# Patient Record
Sex: FEMALE | Race: WHITE | NOT HISPANIC OR LATINO | URBAN - METROPOLITAN AREA
[De-identification: names, ages, dates, MRNs, and addresses within clinical notes are randomized per-mention and may not be internally consistent; named-entity substitution may affect disease eponyms.]

---
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
## 2021-12-21 ENCOUNTER — APPOINTMENT (OUTPATIENT)
Dept: LAB | Facility: MEDICAL CENTER | Age: 21
End: 2021-12-21
Payer: COMMERCIAL

## 2021-12-21 ENCOUNTER — OFFICE VISIT (OUTPATIENT)
Dept: FAMILY MEDICINE CLINIC | Facility: MEDICAL CENTER | Age: 21
End: 2021-12-21
Payer: COMMERCIAL

## 2021-12-21 VITALS
TEMPERATURE: 98.2 F | SYSTOLIC BLOOD PRESSURE: 122 MMHG | HEIGHT: 63 IN | DIASTOLIC BLOOD PRESSURE: 80 MMHG | HEART RATE: 62 BPM | WEIGHT: 195.4 LBS | BODY MASS INDEX: 34.62 KG/M2 | OXYGEN SATURATION: 99 %

## 2021-12-21 DIAGNOSIS — Z11.4 SCREENING FOR HIV WITHOUT PRESENCE OF RISK FACTORS: ICD-10-CM

## 2021-12-21 DIAGNOSIS — Z76.89 ENCOUNTER TO ESTABLISH CARE: ICD-10-CM

## 2021-12-21 DIAGNOSIS — Z23 ENCOUNTER FOR IMMUNIZATION: ICD-10-CM

## 2021-12-21 DIAGNOSIS — Z13.220 SCREENING, LIPID: ICD-10-CM

## 2021-12-21 DIAGNOSIS — Z11.59 NEED FOR HEPATITIS C SCREENING TEST: ICD-10-CM

## 2021-12-21 DIAGNOSIS — Z00.8 EXAM FOR POPULATION SURVEY: ICD-10-CM

## 2021-12-21 DIAGNOSIS — Z13.89 SCREENING FOR BLOOD OR PROTEIN IN URINE: ICD-10-CM

## 2021-12-21 DIAGNOSIS — B07.0 PLANTAR WART OF RIGHT FOOT: Primary | ICD-10-CM

## 2021-12-21 PROCEDURE — 90686 IIV4 VACC NO PRSV 0.5 ML IM: CPT

## 2021-12-21 PROCEDURE — 90715 TDAP VACCINE 7 YRS/> IM: CPT

## 2021-12-21 PROCEDURE — 87389 HIV-1 AG W/HIV-1&-2 AB AG IA: CPT

## 2021-12-21 PROCEDURE — 99214 OFFICE O/P EST MOD 30 MIN: CPT | Performed by: NURSE PRACTITIONER

## 2021-12-21 PROCEDURE — 90472 IMMUNIZATION ADMIN EACH ADD: CPT

## 2021-12-21 PROCEDURE — 86803 HEPATITIS C AB TEST: CPT

## 2021-12-21 PROCEDURE — 90471 IMMUNIZATION ADMIN: CPT

## 2021-12-21 PROCEDURE — 36415 COLL VENOUS BLD VENIPUNCTURE: CPT

## 2021-12-21 RX ORDER — DROSPIRENONE AND ETHINYL ESTRADIOL 0.02-3(28)
1 KIT ORAL DAILY
COMMUNITY
Start: 2021-10-15

## 2021-12-21 RX ORDER — MEDICATED PLANTAR WART REMOVER 40 MG/241
PATCH TOPICAL
Qty: 24 EACH | Refills: 1 | Status: SHIPPED | OUTPATIENT
Start: 2021-12-21

## 2021-12-21 RX ORDER — FEXOFENADINE HCL AND PSEUDOEPHEDRINE HCI 180; 240 MG/1; MG/1
1 TABLET, EXTENDED RELEASE ORAL
COMMUNITY
End: 2022-06-13

## 2021-12-22 LAB
HCV AB SER QL: NORMAL
HIV 1+2 AB+HIV1 P24 AG SERPL QL IA: NORMAL

## 2021-12-27 PROBLEM — Z00.00 ANNUAL PHYSICAL EXAM: Status: ACTIVE | Noted: 2021-12-21

## 2021-12-27 PROBLEM — E66.811 CLASS 1 OBESITY DUE TO EXCESS CALORIES WITHOUT SERIOUS COMORBIDITY WITH BODY MASS INDEX (BMI) OF 34.0 TO 34.9 IN ADULT: Status: ACTIVE | Noted: 2021-12-27

## 2021-12-27 PROBLEM — J30.2 SEASONAL ALLERGIES: Status: ACTIVE | Noted: 2021-12-27

## 2021-12-27 PROBLEM — E66.09 CLASS 1 OBESITY DUE TO EXCESS CALORIES WITHOUT SERIOUS COMORBIDITY WITH BODY MASS INDEX (BMI) OF 34.0 TO 34.9 IN ADULT: Status: ACTIVE | Noted: 2021-12-27

## 2021-12-28 ENCOUNTER — APPOINTMENT (OUTPATIENT)
Dept: LAB | Facility: MEDICAL CENTER | Age: 21
End: 2021-12-28
Payer: COMMERCIAL

## 2021-12-28 ENCOUNTER — OFFICE VISIT (OUTPATIENT)
Dept: FAMILY MEDICINE CLINIC | Facility: MEDICAL CENTER | Age: 21
End: 2021-12-28
Payer: COMMERCIAL

## 2021-12-28 VITALS
SYSTOLIC BLOOD PRESSURE: 122 MMHG | WEIGHT: 199.4 LBS | RESPIRATION RATE: 16 BRPM | TEMPERATURE: 99.8 F | HEIGHT: 63 IN | BODY MASS INDEX: 35.33 KG/M2 | HEART RATE: 76 BPM | DIASTOLIC BLOOD PRESSURE: 76 MMHG

## 2021-12-28 DIAGNOSIS — B07.0 PLANTAR WART OF RIGHT FOOT: ICD-10-CM

## 2021-12-28 DIAGNOSIS — J30.2 SEASONAL ALLERGIES: ICD-10-CM

## 2021-12-28 DIAGNOSIS — Z00.8 EXAM FOR POPULATION SURVEY: ICD-10-CM

## 2021-12-28 DIAGNOSIS — Z00.00 ANNUAL PHYSICAL EXAM: Primary | ICD-10-CM

## 2021-12-28 DIAGNOSIS — E66.09 CLASS 1 OBESITY DUE TO EXCESS CALORIES WITHOUT SERIOUS COMORBIDITY WITH BODY MASS INDEX (BMI) OF 34.0 TO 34.9 IN ADULT: ICD-10-CM

## 2021-12-28 LAB
ALBUMIN SERPL BCP-MCNC: 3.7 G/DL (ref 3.5–5)
ALP SERPL-CCNC: 67 U/L (ref 46–116)
ALT SERPL W P-5'-P-CCNC: 23 U/L (ref 12–78)
ANION GAP SERPL CALCULATED.3IONS-SCNC: 6 MMOL/L (ref 4–13)
AST SERPL W P-5'-P-CCNC: 12 U/L (ref 5–45)
BASOPHILS # BLD AUTO: 0.04 THOUSANDS/ΜL (ref 0–0.1)
BASOPHILS NFR BLD AUTO: 0 % (ref 0–1)
BILIRUB SERPL-MCNC: 0.36 MG/DL (ref 0.2–1)
BUN SERPL-MCNC: 9 MG/DL (ref 5–25)
CALCIUM SERPL-MCNC: 8.9 MG/DL (ref 8.3–10.1)
CHLORIDE SERPL-SCNC: 107 MMOL/L (ref 100–108)
CHOLEST SERPL-MCNC: 179 MG/DL
CO2 SERPL-SCNC: 23 MMOL/L (ref 21–32)
CREAT SERPL-MCNC: 0.77 MG/DL (ref 0.6–1.3)
EOSINOPHIL # BLD AUTO: 0.16 THOUSAND/ΜL (ref 0–0.61)
EOSINOPHIL NFR BLD AUTO: 2 % (ref 0–6)
ERYTHROCYTE [DISTWIDTH] IN BLOOD BY AUTOMATED COUNT: 12.3 % (ref 11.6–15.1)
GFR SERPL CREATININE-BSD FRML MDRD: 111 ML/MIN/1.73SQ M
GLUCOSE P FAST SERPL-MCNC: 95 MG/DL (ref 65–99)
HCT VFR BLD AUTO: 42.2 % (ref 34.8–46.1)
HDLC SERPL-MCNC: 49 MG/DL
HGB BLD-MCNC: 14.2 G/DL (ref 11.5–15.4)
IMM GRANULOCYTES # BLD AUTO: 0.07 THOUSAND/UL (ref 0–0.2)
IMM GRANULOCYTES NFR BLD AUTO: 1 % (ref 0–2)
LDLC SERPL CALC-MCNC: 99 MG/DL (ref 0–100)
LYMPHOCYTES # BLD AUTO: 3.05 THOUSANDS/ΜL (ref 0.6–4.47)
LYMPHOCYTES NFR BLD AUTO: 33 % (ref 14–44)
MCH RBC QN AUTO: 29.8 PG (ref 26.8–34.3)
MCHC RBC AUTO-ENTMCNC: 33.6 G/DL (ref 31.4–37.4)
MCV RBC AUTO: 89 FL (ref 82–98)
MONOCYTES # BLD AUTO: 0.69 THOUSAND/ΜL (ref 0.17–1.22)
MONOCYTES NFR BLD AUTO: 8 % (ref 4–12)
NEUTROPHILS # BLD AUTO: 5.18 THOUSANDS/ΜL (ref 1.85–7.62)
NEUTS SEG NFR BLD AUTO: 56 % (ref 43–75)
NONHDLC SERPL-MCNC: 130 MG/DL
NRBC BLD AUTO-RTO: 0 /100 WBCS
PLATELET # BLD AUTO: 232 THOUSANDS/UL (ref 149–390)
PMV BLD AUTO: 11.7 FL (ref 8.9–12.7)
POTASSIUM SERPL-SCNC: 4 MMOL/L (ref 3.5–5.3)
PROT SERPL-MCNC: 7.4 G/DL (ref 6.4–8.2)
RBC # BLD AUTO: 4.76 MILLION/UL (ref 3.81–5.12)
SODIUM SERPL-SCNC: 136 MMOL/L (ref 136–145)
TRIGL SERPL-MCNC: 153 MG/DL
WBC # BLD AUTO: 9.19 THOUSAND/UL (ref 4.31–10.16)

## 2021-12-28 PROCEDURE — 1036F TOBACCO NON-USER: CPT | Performed by: NURSE PRACTITIONER

## 2021-12-28 PROCEDURE — 80061 LIPID PANEL: CPT

## 2021-12-28 PROCEDURE — 85025 COMPLETE CBC W/AUTO DIFF WBC: CPT

## 2021-12-28 PROCEDURE — 3008F BODY MASS INDEX DOCD: CPT | Performed by: NURSE PRACTITIONER

## 2021-12-28 PROCEDURE — 36415 COLL VENOUS BLD VENIPUNCTURE: CPT

## 2021-12-28 PROCEDURE — 99395 PREV VISIT EST AGE 18-39: CPT | Performed by: NURSE PRACTITIONER

## 2021-12-28 PROCEDURE — 80053 COMPREHEN METABOLIC PANEL: CPT

## 2021-12-28 PROCEDURE — 3725F SCREEN DEPRESSION PERFORMED: CPT | Performed by: NURSE PRACTITIONER

## 2021-12-30 ENCOUNTER — TELEPHONE (OUTPATIENT)
Dept: FAMILY MEDICINE CLINIC | Facility: MEDICAL CENTER | Age: 21
End: 2021-12-30

## 2022-05-26 ENCOUNTER — OFFICE VISIT (OUTPATIENT)
Dept: URGENT CARE | Facility: MEDICAL CENTER | Age: 22
End: 2022-05-26
Payer: COMMERCIAL

## 2022-05-26 VITALS
SYSTOLIC BLOOD PRESSURE: 170 MMHG | DIASTOLIC BLOOD PRESSURE: 80 MMHG | TEMPERATURE: 98.2 F | HEART RATE: 82 BPM | WEIGHT: 185 LBS | HEIGHT: 63 IN | OXYGEN SATURATION: 95 % | BODY MASS INDEX: 32.78 KG/M2

## 2022-05-26 DIAGNOSIS — L23.89 ALLERGIC CONTACT DERMATITIS DUE TO OTHER AGENTS: Primary | ICD-10-CM

## 2022-05-26 DIAGNOSIS — Z91.018 NUT ALLERGY: ICD-10-CM

## 2022-05-26 PROCEDURE — G0382 LEV 3 HOSP TYPE B ED VISIT: HCPCS | Performed by: PHYSICIAN ASSISTANT

## 2022-05-26 RX ORDER — PREDNISONE 20 MG/1
TABLET ORAL
Qty: 9 TABLET | Refills: 0 | Status: SHIPPED | OUTPATIENT
Start: 2022-05-26 | End: 2022-06-13

## 2022-05-26 NOTE — PROGRESS NOTES
[facility]        NAME: Liz Gonsales is a 22 y o  female  : 2000    MRN: [id number]  DATE: May 26, 2022  TIME: 10:07 AM    Assessment and Plan   Allergic contact dermatitis due to other agents [L23 89]  1  Allergic contact dermatitis due to other agents  predniSONE 20 mg tablet   2  Nut allergy  predniSONE 20 mg tablet         Patient Instructions   1  Take Benadryl (diphenhydramine) 25-50 mg every 4-6 hours as needed for itch  2  Go to the ER immediately for any worsening symptoms  3  Follow-up back here with primary care in 3-5 days for any persistent symptoms  Chief Complaint     Chief Complaint   Patient presents with    Allergic Reaction     Today spilled almond milk on skin around 9:15  After started getting itchy feeling, one hive on hand  Does have allergic reaction to almonds  Did go to [facility] and 30 mins ago got benadryl and hydrocortazone cream            History of Present Illness       52-year-old female with a known history of almond allergy when taken internally accidentally splashed almond milk on her arms about an hour ago  She soon subsequently began to experience a itching, red rash to the area  She immediately took Benadryl which partially helped  She denies any throat closing, shortness of breath, chest heaviness, near syncope, etc   She washed her arms immediately after the incident  She took no almond milk internally  Review of Systems   Review of Systems   Constitutional: Negative for chills and fever  HENT: Negative for ear pain and sore throat  Eyes: Negative for pain and visual disturbance  Respiratory: Negative for cough and shortness of breath  Cardiovascular: Negative for chest pain and palpitations  Gastrointestinal: Negative for abdominal pain and vomiting  Genitourinary: Negative for dysuria and hematuria  Musculoskeletal: Negative for arthralgias and back pain  Skin: Positive for rash  Negative for color change  Neurological: Negative for seizures and syncope  All other systems reviewed and are negative  Current Medications       Current Outpatient Medications:     drospirenone-ethinyl estradiol (TORY) 3-0 02 MG per tablet, Take 1 tablet by mouth daily, Disp: , Rfl:     predniSONE 20 mg tablet, Take all 3 tablets together once daily in the morning x3 days, Disp: 9 tablet, Rfl: 0    fexofenadine-pseudoephedrine (ALLEGRA-D 24) 180-240 MG per 24 hr tablet, Take 1 tablet by mouth (Patient not taking: Reported on 5/26/2022), Disp: , Rfl:     Salicylic Acid ([employer] Plantar Wart Remover) 40 % PADS, Cleanse right foot plantar wart two times daily  (Patient not taking: Reported on 5/26/2022), Disp: 24 each, Rfl: 1    Current Allergies     Allergies as of 05/26/2022 - Reviewed 05/26/2022   Allergen Reaction Noted    Treenut [nuts - food allergy] Anaphylaxis 07/26/2021            The following portions of the patient's history were reviewed and updated as appropriate: allergies, current medications, past family history, past medical history, past social history, past surgical history and problem list      Past Medical History:   Diagnosis Date    Patient denies medical problems        History reviewed  No pertinent surgical history  No family history on file  Medications have been verified  Objective   /80   Pulse 82   Temp 98 2 °F (36 8 °C) (Temporal)   Ht 5' 3" (1 6 m)   Wt 83 9 kg (185 lb)   SpO2 95%   BMI 32 77 kg/m²        Physical Exam     Physical Exam  Constitutional:       Appearance: Normal appearance  HENT:      Head: Normocephalic  Nose: Nose normal       Mouth/Throat:      Mouth: Mucous membranes are moist       Pharynx: Oropharynx is clear  Eyes:      Conjunctiva/sclera: Conjunctivae normal       Pupils: Pupils are equal, round, and reactive to light  Cardiovascular:      Rate and Rhythm: Normal rate and regular rhythm     Pulmonary:      Effort: Pulmonary effort is normal       Breath sounds: Normal breath sounds  Musculoskeletal:         General: Normal range of motion  Cervical back: Normal range of motion  Skin:     Capillary Refill: Capillary refill takes less than 2 seconds  Comments: Red blotchy rash noted to the forearms  Neurological:      General: No focal deficit present  Mental Status: She is alert and oriented to person, place, and time     Psychiatric:         Mood and Affect: Mood normal          Behavior: Behavior normal

## 2022-05-26 NOTE — PATIENT INSTRUCTIONS
1  Take Benadryl (diphenhydramine) 25-50 mg every 4-6 hours as needed for itch  2  Go to the ER immediately for any worsening symptoms  3  Follow-up back here with primary care in 3-5 days for any persistent symptoms

## 2022-05-28 PROBLEM — E66.9 CLASS 1 OBESITY WITHOUT SERIOUS COMORBIDITY WITH BODY MASS INDEX (BMI) OF 32.0 TO 32.9 IN ADULT: Status: ACTIVE | Noted: 2021-12-27

## 2022-06-09 PROBLEM — J30.89 ALLERGIC RHINITIS DUE TO HOUSE DUST MITE: Status: ACTIVE | Noted: 2022-06-09

## 2022-06-09 PROBLEM — Z91.018 FOOD ALLERGY: Status: ACTIVE | Noted: 2022-06-09

## 2022-06-09 PROBLEM — J30.1 CHRONIC SEASONAL ALLERGIC RHINITIS DUE TO POLLEN: Status: ACTIVE | Noted: 2022-06-09

## 2022-06-09 PROBLEM — H10.13 CONJUNCTIVITIS, ALLERGIC, BILATERAL: Status: ACTIVE | Noted: 2022-06-09

## 2022-07-05 ENCOUNTER — APPOINTMENT (OUTPATIENT)
Dept: LAB | Facility: MEDICAL CENTER | Age: 22
End: 2022-07-05
Payer: COMMERCIAL

## 2022-07-05 ENCOUNTER — LAB (OUTPATIENT)
Dept: LAB | Facility: MEDICAL CENTER | Age: 22
End: 2022-07-05
Payer: COMMERCIAL

## 2022-07-05 DIAGNOSIS — R14.0 ABDOMINAL BLOATING: ICD-10-CM

## 2022-07-05 DIAGNOSIS — Z91.018 FOOD ALLERGY: ICD-10-CM

## 2022-07-05 LAB — IGA SERPL-MCNC: 210 MG/DL (ref 70–400)

## 2022-07-05 PROCEDURE — 86003 ALLG SPEC IGE CRUDE XTRC EA: CPT

## 2022-07-05 PROCEDURE — 81382 HLA II TYPING 1 LOC HR: CPT

## 2022-07-05 PROCEDURE — 82784 ASSAY IGA/IGD/IGG/IGM EACH: CPT

## 2022-07-05 PROCEDURE — 81376 HLA II TYPING 1 LOCUS LR: CPT

## 2022-07-05 PROCEDURE — 86008 ALLG SPEC IGE RECOMB EA: CPT

## 2022-07-05 PROCEDURE — 86364 TISS TRNSGLTMNASE EA IG CLAS: CPT

## 2022-07-05 PROCEDURE — 36415 COLL VENOUS BLD VENIPUNCTURE: CPT

## 2022-07-06 LAB
ALMOND IGE QN: <0.1 KUA/I
HAZELNUT IGE QN: 35.4 KUA/L
PEANUT IGE QN: <0.1 KUA/I
SESAME SEED IGE QN: <0.1 KUA/I
SOYBEAN IGE QN: <0.1 KUA/I
TTG IGA SER-ACNC: <2 U/ML (ref 0–3)
WALNUT IGE QN: <0.1 KUA/I

## 2022-07-08 LAB
ARA H6 PEANUT: <0.1 KUA/I
PEANUT (RARA H) 1 IGE QN: <0.1 KUA/I
PEANUT (RARA H) 2 IGE QN: <0.1 KUA/I
PEANUT (RARA H) 3 IGE QN: <0.1 KUA/I
PEANUT (RARA H) 8 IGE QN: 7.96 KUA/I
PEANUT (RARA H) 9 IGE QN: <0.1 KUA/I

## 2022-07-09 LAB — CELERY IGE QN: 0.78 KU/L

## 2022-07-13 PROBLEM — T78.1XXS: Status: ACTIVE | Noted: 2022-07-13

## 2022-07-13 LAB
ANNOTATION COMMENT IMP: NORMAL
HLA-DQ2 QL: POSITIVE
HLA-DQ8 QL: NEGATIVE
REF LAB TEST METHOD: NORMAL

## 2022-08-04 NOTE — RESULT ENCOUNTER NOTE
IgE to hazelnut elevated  IgE to Argelia h8  component of peanut elevated  IgE to peanut, almond, sesame, soy, walnut  are undetectable  IgE to celery low positive

## 2022-10-11 PROBLEM — H10.13 CONJUNCTIVITIS, ALLERGIC, BILATERAL: Status: RESOLVED | Noted: 2022-06-09 | Resolved: 2022-10-11

## 2022-10-12 PROBLEM — Z11.59 NEED FOR HEPATITIS C SCREENING TEST: Status: RESOLVED | Noted: 2021-12-21 | Resolved: 2022-10-12

## 2022-10-12 PROBLEM — Z00.8 EXAM FOR POPULATION SURVEY: Status: RESOLVED | Noted: 2021-12-21 | Resolved: 2022-10-12

## 2022-10-25 ENCOUNTER — PATIENT MESSAGE (OUTPATIENT)
Dept: FAMILY MEDICINE CLINIC | Facility: CLINIC | Age: 22
End: 2022-10-25

## 2023-09-06 ENCOUNTER — OFFICE VISIT (OUTPATIENT)
Dept: URGENT CARE | Facility: MEDICAL CENTER | Age: 23
End: 2023-09-06
Payer: COMMERCIAL

## 2023-09-06 ENCOUNTER — HOSPITAL ENCOUNTER (EMERGENCY)
Facility: HOSPITAL | Age: 23
Discharge: HOME | End: 2023-09-06
Attending: EMERGENCY MEDICINE
Payer: COMMERCIAL

## 2023-09-06 VITALS
OXYGEN SATURATION: 98 % | SYSTOLIC BLOOD PRESSURE: 140 MMHG | DIASTOLIC BLOOD PRESSURE: 94 MMHG | TEMPERATURE: 98.3 F | HEART RATE: 82 BPM | WEIGHT: 190 LBS | BODY MASS INDEX: 33.66 KG/M2 | RESPIRATION RATE: 18 BRPM | HEIGHT: 63 IN

## 2023-09-06 VITALS
RESPIRATION RATE: 18 BRPM | OXYGEN SATURATION: 98 % | TEMPERATURE: 98.4 F | SYSTOLIC BLOOD PRESSURE: 116 MMHG | HEART RATE: 71 BPM | DIASTOLIC BLOOD PRESSURE: 76 MMHG

## 2023-09-06 DIAGNOSIS — T78.05XA: Primary | ICD-10-CM

## 2023-09-06 DIAGNOSIS — T78.40XA ALLERGIC REACTION, INITIAL ENCOUNTER: Primary | ICD-10-CM

## 2023-09-06 PROCEDURE — 99213 OFFICE O/P EST LOW 20 MIN: CPT | Performed by: PHYSICIAN ASSISTANT

## 2023-09-06 PROCEDURE — 99284 EMERGENCY DEPT VISIT MOD MDM: CPT | Performed by: EMERGENCY MEDICINE

## 2023-09-06 PROCEDURE — 99283 EMERGENCY DEPT VISIT LOW MDM: CPT

## 2023-09-06 PROCEDURE — 96372 THER/PROPH/DIAG INJ SC/IM: CPT | Performed by: PHYSICIAN ASSISTANT

## 2023-09-06 RX ORDER — EPINEPHRINE 1 MG/ML
0.3 INJECTION, SOLUTION, CONCENTRATE INTRAVENOUS ONCE
Status: COMPLETED | OUTPATIENT
Start: 2023-09-06 | End: 2023-09-06

## 2023-09-06 RX ORDER — DIPHENHYDRAMINE HCL 25 MG
25 TABLET ORAL ONCE
Status: COMPLETED | OUTPATIENT
Start: 2023-09-06 | End: 2023-09-06

## 2023-09-06 RX ORDER — EPINEPHRINE 0.3 MG/.3ML
0.3 INJECTION SUBCUTANEOUS ONCE
Status: DISCONTINUED | OUTPATIENT
Start: 2023-09-06 | End: 2023-09-06

## 2023-09-06 RX ORDER — FAMOTIDINE 20 MG/1
20 TABLET, FILM COATED ORAL ONCE
Status: COMPLETED | OUTPATIENT
Start: 2023-09-06 | End: 2023-09-06

## 2023-09-06 RX ORDER — PREDNISONE 50 MG/1
50 TABLET ORAL DAILY
Qty: 3 TABLET | Refills: 0 | Status: SHIPPED | OUTPATIENT
Start: 2023-09-07 | End: 2023-09-10

## 2023-09-06 RX ORDER — METHYLPREDNISOLONE SODIUM SUCCINATE 125 MG/2ML
125 INJECTION, POWDER, LYOPHILIZED, FOR SOLUTION INTRAMUSCULAR; INTRAVENOUS ONCE
Status: COMPLETED | OUTPATIENT
Start: 2023-09-06 | End: 2023-09-06

## 2023-09-06 RX ADMIN — EPINEPHRINE 0.3 MG: 1 INJECTION, SOLUTION, CONCENTRATE INTRAVENOUS at 19:20

## 2023-09-06 RX ADMIN — METHYLPREDNISOLONE SODIUM SUCCINATE 125 MG: 125 INJECTION, POWDER, LYOPHILIZED, FOR SOLUTION INTRAMUSCULAR; INTRAVENOUS at 19:13

## 2023-09-06 RX ADMIN — FAMOTIDINE 20 MG: 20 TABLET, FILM COATED ORAL at 22:00

## 2023-09-06 RX ADMIN — DIPHENHYDRAMINE HYDROCHLORIDE 25 MG: 25 TABLET ORAL at 22:00

## 2023-09-06 NOTE — PATIENT INSTRUCTIONS
Allergic reaction  Steroids given in the office  Patient referred to the emergency room  Declined ambulance transfer and will call with friends via car  Follow up with PCP in 3-5 days. Proceed to  ER if symptoms worsen.

## 2023-09-08 NOTE — ED PROVIDER NOTES
History  Chief Complaint   Patient presents with   • Allergic Reaction     Ate cake that likely had nuts in it. Patient has an allergy to same. Started feeling itchy in her throat/mouth, difficulty breathing. Was at urgent care and was given Epi and Solu-medrol and then sent her to ED. VSS in triage. 52-year-old female with a history of allergy to tree nuts presents to the emergency department from urgent care for evaluation of allergic reaction. Patient states that she was visiting her grandmother at a nursing facility when she was offered a piece of cake. She took a bite of cake around 5:30 PM and within minutes felt an abnormal sensation in her mouth consistent with prior allergic reactions. Patient states that she develops a numbness and tingling sensation along the lateral aspects of her tongue followed by itching of her soft and hard palate. She also felt tightness in her throat and felt as if there was something in her throat that she was unable to swallow. Patient was given a shot of epinephrine and Solu-Medrol at the urgent care. She also took children's Benadryl prior to urgent care evaluation. She is currently feeling much better she continues to feel aabnormal sensation in the throat when swallowing but overall feels improved. She denies wheezing, no vomiting or diarrhea. Patient has had previous reactions when ingesting nuts and also has had localized skin reactions when she touches things such as almond milk.       History provided by:  Patient, medical records and parent   used: No    Allergic Reaction  Presenting symptoms: difficulty breathing, difficulty swallowing, itching and swelling    Presenting symptoms: no rash and no wheezing    Severity:  Moderate  Duration:  1 hour  Prior allergic episodes:  Food/nut allergies  Context: food and nuts    Relieved by:  Epinephrine and steroids      Prior to Admission Medications   Prescriptions Last Dose Informant Patient Reported? Taking? EPINEPHrine (EPIPEN) 0.3 mg/0.3 mL SOAJ   Yes No   Sig: As directed   Salicylic Acid ([employer] Plantar Wart Remover) 40 % PADS   No No   Sig: Cleanse right foot plantar wart two times daily. Patient not taking: Reported on 2022   diphenhydrAMINE (BENADRYL) 25 mg capsule   Yes No   Sig: Take 25 mg by mouth every 6 (six) hours as needed   drospirenone-ethinyl estradiol (TORY) 3-0.02 MG per tablet   Yes No   Sig: Take 1 tablet by mouth daily   Patient not taking: Reported on 2023   fexofenadine (ALLEGRA) 180 MG tablet   No No   Sig: Take 1 tablet (180 mg total) by mouth daily   fluticasone (VERAMYST) 27.5 MCG/SPRAY nasal spray   No No   Si sprays into each nostril daily   Patient not taking: Reported on 2023   levocetirizine (XYZAL) 5 MG tablet   No No   Sig: Take 1 tablet (5 mg total) by mouth every evening As needed   Patient not taking: Reported on 2023      Facility-Administered Medications Last Administration Doses Remaining   EPINEPHrine PF (ADRENALIN) 1 mg/mL injection 0.3 mg 2023  7:20 PM 0   methylPREDNISolone sodium succinate (Solu-MEDROL) injection 125 mg 2023  7:13 PM 0          Past Medical History:   Diagnosis Date   • Patient denies medical problems        History reviewed. No pertinent surgical history. Family History   Problem Relation Age of Onset   • Asthma Mother    • Allergies Mother    • Allergies Father    • Diabetes Father      I have reviewed and agree with the history as documented. E-Cigarette/Vaping   • E-Cigarette Use Never User      E-Cigarette/Vaping Substances   • Nicotine No    • THC No    • CBD No    • Flavoring No    • Other No    • Unknown No      Social History     Tobacco Use   • Smoking status: Never   • Smokeless tobacco: Former   Vaping Use   • Vaping Use: Never used   Substance Use Topics   • Drug use: Never       Review of Systems   Constitutional: Negative for appetite change.    HENT: Positive for sore throat and trouble swallowing. Negative for voice change. Respiratory: Negative for shortness of breath, wheezing and stridor. Gastrointestinal: Negative for abdominal pain, diarrhea, nausea and vomiting. Genitourinary: Negative for dysuria. Musculoskeletal: Negative for back pain. Skin: Positive for itching. Negative for rash. Neurological: Negative for weakness. All other systems reviewed and are negative. Physical Exam  Physical Exam  Vitals reviewed. Constitutional:       General: She is not in acute distress. Appearance: She is well-developed. She is not ill-appearing. HENT:      Head: Normocephalic. Right Ear: External ear normal.      Left Ear: External ear normal.      Nose: Nose normal. No congestion or rhinorrhea. Mouth/Throat:      Mouth: Mucous membranes are moist.      Pharynx: Uvula midline. No pharyngeal swelling or oropharyngeal exudate. Tonsils: No tonsillar exudate. Comments: Minimal uvular swelling, swallowing well and handling secretions  Eyes:      General: No scleral icterus. Conjunctiva/sclera: Conjunctivae normal.      Pupils: Pupils are equal, round, and reactive to light. Cardiovascular:      Rate and Rhythm: Normal rate and regular rhythm. Pulses: Normal pulses. Heart sounds: Normal heart sounds. Pulmonary:      Effort: Pulmonary effort is normal. No respiratory distress. Breath sounds: No wheezing or rhonchi. Abdominal:      General: Bowel sounds are normal. There is no distension. Palpations: Abdomen is soft. Tenderness: There is no abdominal tenderness. There is no guarding or rebound. Musculoskeletal:         General: No tenderness or deformity. Normal range of motion. Cervical back: Normal range of motion and neck supple. Lymphadenopathy:      Cervical: No cervical adenopathy. Skin:     General: Skin is warm and dry. Findings: No rash.       Comments: No rash   Neurological:      General: No focal deficit present. Mental Status: She is alert and oriented to person, place, and time. Cranial Nerves: No cranial nerve deficit. Sensory: No sensory deficit. Motor: No abnormal muscle tone. Coordination: Coordination normal.      Gait: Gait normal.      Deep Tendon Reflexes: Reflexes are normal and symmetric. Psychiatric:         Behavior: Behavior normal.         Thought Content: Thought content normal.         Judgment: Judgment normal.         Vital Signs  ED Triage Vitals [09/06/23 2003]   Temperature Pulse Respirations Blood Pressure SpO2   98.4 °F (36.9 °C) 72 18 144/73 100 %      Temp Source Heart Rate Source Patient Position - Orthostatic VS BP Location FiO2 (%)   Oral Monitor Sitting Right arm --      Pain Score       --           Vitals:    09/06/23 2003 09/06/23 2030 09/06/23 2201 09/06/23 2315   BP: 144/73 121/89 116/76    Pulse: 72 82 81 71   Patient Position - Orthostatic VS: Sitting Sitting Sitting          Visual Acuity      ED Medications  Medications   famotidine (PEPCID) tablet 20 mg (20 mg Oral Given 9/6/23 2200)   diphenhydrAMINE (BENADRYL) tablet 25 mg (25 mg Oral Given 9/6/23 2200)       Diagnostic Studies  Results Reviewed     None                 No orders to display              Procedures  Procedures         ED Course  ED Course as of 09/08/23 0451   Wed Sep 06, 2023   2316 Patient reevaluated by me, she is feeling much better. No difficulty swallowing. No shortness of breath or facial swelling, no rash. We will plan to discharge on oral corticosteroid for 3 days. Patient has EpiPen to use at home as needed                                             Medical Decision Making  Anaphylaxis due to tree nut: acute illness or injury  Amount and/or Complexity of Data Reviewed  Independent Historian: friend     Details: Friend at bedside to help provide history  External Data Reviewed: notes.      Details: Notes from urgent care visit reviewed by me      Risk  OTC drugs.  Prescription drug management. Risk Details: 45-year-old female presents after having a acute allergic reaction. Patient's symptoms subsided after receiving epinephrine at the urgent care, she did not require further dosing. Plan is to discharge with a short course of corticosteroid patient does have a refill of her epinephrine pen at home. Discussed signs and symptoms return to the emergency department. Patient felt comfortable discharge plan        Disposition  Final diagnoses:   Anaphylaxis due to tree nut     Time reflects when diagnosis was documented in both MDM as applicable and the Disposition within this note     Time User Action Codes Description Comment    9/6/2023 11:10 PM Haley Rai Add [T78.05XA] Anaphylaxis due to tree nut       ED Disposition     ED Disposition   Discharge    Condition   Stable    Date/Time   Wed Sep 6, 2023 11:09 PM    Comment   Stevie Delgado discharge to home/self care.                Follow-up Information     Follow up With Specialties Details Why [address] [facility],[address], [address] [facility] Nurse Practitioner, Family Medicine Schedule an appointment as soon as possible for a visit in 2 days For recheck of current symptoms [address]  [phone]            Discharge Medication List as of 9/6/2023 11:13 PM      START taking these medications    Details   predniSONE 50 mg tablet Take 1 tablet (50 mg total) by mouth daily for 3 days Do not start before September 7, 2023., Starting u 9/7/2023, Until Sun 9/10/2023, Normal         CONTINUE these medications which have NOT CHANGED    Details   diphenhydrAMINE (BENADRYL) 25 mg capsule Take 25 mg by mouth every 6 (six) hours as needed, Historical Med      drospirenone-ethinyl estradiol (TORY) 3-0.02 MG per tablet Take 1 tablet by mouth daily, Starting Fri 10/15/2021, Historical Med      EPINEPHrine (EPIPEN) 0.3 mg/0.3 mL SOAJ As directed, Starting Tue 5/31/2022, Historical Med fexofenadine (ALLEGRA) 180 MG tablet Take 1 tablet (180 mg total) by mouth daily, Starting Wed 7/13/2022, No Print      fluticasone (VERAMYST) 27.5 MCG/SPRAY nasal spray 2 sprays into each nostril daily, Starting Wed 7/13/2022, No Print      levocetirizine (XYZAL) 5 MG tablet Take 1 tablet (5 mg total) by mouth every evening As needed, Starting Wed 7/13/2022, No Print      Salicylic Acid ([employer] Plantar Wart Remover) 40 % PADS Cleanse right foot plantar wart two times daily. , Normal             No discharge procedures on file.     PDMP Review     None          ED Provider  Electronically Signed by           Kelsey Acosta DO  09/08/23 0735

## 2023-11-19 ENCOUNTER — OFFICE VISIT (OUTPATIENT)
Dept: URGENT CARE | Facility: MEDICAL CENTER | Age: 23
End: 2023-11-19
Payer: COMMERCIAL

## 2023-11-19 VITALS
SYSTOLIC BLOOD PRESSURE: 121 MMHG | BODY MASS INDEX: 35.79 KG/M2 | WEIGHT: 202 LBS | TEMPERATURE: 97.8 F | HEART RATE: 68 BPM | OXYGEN SATURATION: 98 % | RESPIRATION RATE: 18 BRPM | HEIGHT: 63 IN | DIASTOLIC BLOOD PRESSURE: 72 MMHG

## 2023-11-19 DIAGNOSIS — J02.9 PHARYNGITIS WITH VIRAL SYNDROME: Primary | ICD-10-CM

## 2023-11-19 DIAGNOSIS — H65.01 RIGHT ACUTE SEROUS OTITIS MEDIA, RECURRENCE NOT SPECIFIED: ICD-10-CM

## 2023-11-19 DIAGNOSIS — B34.9 PHARYNGITIS WITH VIRAL SYNDROME: Primary | ICD-10-CM

## 2023-11-19 PROCEDURE — 87070 CULTURE OTHR SPECIMN AEROBIC: CPT

## 2023-11-19 PROCEDURE — 87880 STREP A ASSAY W/OPTIC: CPT

## 2023-11-19 PROCEDURE — 99213 OFFICE O/P EST LOW 20 MIN: CPT

## 2023-11-19 RX ORDER — METHYLPREDNISOLONE 4 MG/1
TABLET ORAL
Qty: 1 EACH | Refills: 0 | Status: SHIPPED | OUTPATIENT
Start: 2023-11-19

## 2023-11-19 RX ORDER — BENZONATATE 200 MG/1
200 CAPSULE ORAL 3 TIMES DAILY PRN
Qty: 30 CAPSULE | Refills: 0 | Status: SHIPPED | OUTPATIENT
Start: 2023-11-19

## 2023-11-19 NOTE — PROGRESS NOTES
[facility]        NAME: Mari Goode is a 26 y.o. female  : 2000    MRN: [id number]  DATE: 2023  TIME: 11:25 PM    Assessment and Plan   Pharyngitis with viral syndrome [J02.9, B34.9]  1. Pharyngitis with viral syndrome  methylPREDNISolone 4 MG tablet therapy pack    POCT rapid strepA    Throat culture    benzonatate (TESSALON) 200 MG capsule      2. Right acute serous otitis media, recurrence not specified          POCT rapid strepA: Negative    Will send out for culture. If positive will treat with antibiotics. Patient Instructions   Most colds are caused by virus, which does not respond to antibiotics. Typically viruses are self limiting and will go away own their own. There are both over the counter medicines or prescriptions medicines that can be used to help with symptoms until the virus had a chance to run it's course. Florencio Soares has fluid behind  right ear drums, but this is not causing infection. No antibiotics are required to get rid of the fluid. Eustachian tube dysfunction is inflammation of the tubes that drain the space behind the ear drum. When this swelling occurs, fluid can become trapped behind the ear drum. Fluid typically resolves on its own over a 4-6 week timeframe. Will opt to treat with a Medrol dose pack, please take as directed. Take steroids as prescribed. Recommend to take them in the morning and with food  Do not take Ibuprofen while on steroids. May take Acetaminophen for pain. Take Tessalon as prescribed  Vitamin D3 2000 IU daily  Vitamin C 1000mg twice per day  Multivitamin daily  Some studies suggest that Zinc 12.5-15mg every 2 hours while awake x 5 days may shorten the duration cold symptoms by 1-2 days.      Encourage fluids  Rest  Nasal saline spray  Over the counter decongestant; Afrin if severe congestion (do not use for more than 3 days)  Tylenol/Ibuprofen for pain/fever  Salt water gargles and chloraseptic spray  Tsp of honey  Warm tea with honey. May use lemon. Throat lozenges  Throat Coat Tea  Warm compresses over sinuses  Steam treatment (utilize proper safety precautions when in contact with hot water/steam)    Call PCP if symptoms not improving after 10-12 days, for persistent fever (more than 4-5 days total), concerns about breathing, persistent ear pain or signs of dehydration (decreased urine, dry, cracked lips). Follow up with PCP in 3-5 days. Proceed to ER if symptoms worsen. Chief Complaint     Chief Complaint   Patient presents with   • Cough     Also have a cough for just as long as the throat. • Sore Throat     Pt st she had a sore throat for a few days even with over the counter meds. History of Present Illness       Sore Throat   This is a new problem. The current episode started in the past 7 days. The problem has been unchanged. There has been no fever. The pain is at a severity of 6/10. The pain is moderate. Associated symptoms include coughing. Pertinent negatives include no abdominal pain, congestion, diarrhea, ear discharge, ear pain, headaches, shortness of breath or vomiting. She has had no exposure to strep or mono. Review of Systems   Review of Systems   Constitutional:  Negative for chills, diaphoresis, fatigue and fever. HENT:  Positive for sore throat. Negative for congestion, ear discharge, ear pain, postnasal drip, rhinorrhea, sinus pressure and sinus pain. Respiratory:  Positive for cough. Negative for shortness of breath and wheezing. Cardiovascular: Negative. Negative for chest pain and palpitations. Gastrointestinal:  Negative for abdominal pain, constipation, diarrhea, nausea and vomiting. Skin: Negative. Negative for color change and wound. Neurological:  Negative for dizziness, light-headedness and headaches.      Current Medications       Current Outpatient Medications:   •  benzonatate (TESSALON) 200 MG capsule, Take 1 capsule (200 mg total) by mouth 3 (three) times a day as needed for cough, Disp: 30 capsule, Rfl: 0  •  EPINEPHrine (EPIPEN) 0.3 mg/0.3 mL SOAJ, As directed, Disp: , Rfl:   •  fexofenadine (ALLEGRA) 180 MG tablet, Take 1 tablet (180 mg total) by mouth daily, Disp: , Rfl:   •  fluticasone (VERAMYST) 27.5 MCG/SPRAY nasal spray, 2 sprays into each nostril daily, Disp: , Rfl:   •  methylPREDNISolone 4 MG tablet therapy pack, Use as directed on package, Disp: 1 each, Rfl: 0    Current Allergies     Allergies as of 11/19/2023 - Reviewed 11/19/2023   Allergen Reaction Noted   • 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     Past Medical History:   Diagnosis Date   • Patient denies medical problems        History reviewed. No pertinent surgical history. Family History   Problem Relation Age of Onset   • Asthma Mother    • Allergies Mother    • Allergies Father    • Diabetes Father          Medications have been verified. Objective   /72 (BP Location: Right arm, Patient Position: Sitting)   Pulse 68   Temp 97.8 °F (36.6 °C)   Resp 18   Ht 5' 3" (1.6 m)   Wt 91.6 kg (202 lb)   SpO2 98%   BMI 35.78 kg/m²        Physical Exam     Physical Exam  Vitals and nursing note reviewed. Constitutional:       General: She is not in acute distress. Appearance: She is well-developed. She is not ill-appearing, toxic-appearing or diaphoretic. HENT:      Head: Normocephalic. Right Ear: Ear canal and external ear normal. A middle ear effusion (serous) is present. Tympanic membrane is not erythematous or bulging. Left Ear: Tympanic membrane and ear canal normal.      Nose: No congestion or rhinorrhea. Mouth/Throat:      Mouth: Mucous membranes are moist.      Pharynx: Uvula midline. Posterior oropharyngeal erythema present. No pharyngeal swelling, oropharyngeal exudate or uvula swelling. Cardiovascular:      Rate and Rhythm: Normal rate and regular rhythm. Heart sounds: Normal heart sounds. No murmur heard. Pulmonary:      Effort: Pulmonary effort is normal. No respiratory distress. Breath sounds: Normal breath sounds. No stridor. No wheezing, rhonchi or rales. Chest:      Chest wall: No tenderness. Lymphadenopathy:      Cervical: No cervical adenopathy. Neurological:      Mental Status: She is alert.

## 2023-11-19 NOTE — PATIENT INSTRUCTIONS
Most colds are caused by virus, which does not respond to antibiotics. Typically viruses are self limiting and will go away own their own. There are both over the counter medicines or prescriptions medicines that can be used to help with symptoms until the virus had a chance to run it's course. Napoleon Jefferson has fluid behind  right ear drums, but this is not causing infection. No antibiotics are required to get rid of the fluid. Eustachian tube dysfunction is inflammation of the tubes that drain the space behind the ear drum. When this swelling occurs, fluid can become trapped behind the ear drum. Fluid typically resolves on its own over a 4-6 week timeframe. Will opt to treat with a Medrol dose pack, please take as directed. Take steroids as prescribed. Recommend to take them in the morning and with food  Do not take Ibuprofen while on steroids. May take Acetaminophen for pain. Take Tessalon as prescribed  Vitamin D3 2000 IU daily  Vitamin C 1000mg twice per day  Multivitamin daily  Some studies suggest that Zinc 12.5-15mg every 2 hours while awake x 5 days may shorten the duration cold symptoms by 1-2 days. Encourage fluids  Rest  Nasal saline spray  Over the counter decongestant; Afrin if severe congestion (do not use for more than 3 days)  Tylenol/Ibuprofen for pain/fever  Salt water gargles and chloraseptic spray  Tsp of honey  Warm tea with honey. May use lemon. Throat lozenges  Throat Coat Tea  Warm compresses over sinuses  Steam treatment (utilize proper safety precautions when in contact with hot water/steam)    Call PCP if symptoms not improving after 10-12 days, for persistent fever (more than 4-5 days total), concerns about breathing, persistent ear pain or signs of dehydration (decreased urine, dry, cracked lips). Follow up with PCP in 3-5 days. Proceed to ER if symptoms worsen.

## 2023-11-22 LAB — BACTERIA THROAT CULT: NORMAL

## 2023-11-26 LAB — S PYO AG THROAT QL: NEGATIVE

## 2024-08-01 ENCOUNTER — APPOINTMENT (OUTPATIENT)
Dept: RADIOLOGY | Facility: MEDICAL CENTER | Age: 24
End: 2024-08-01
Attending: PHYSICIAN ASSISTANT
Payer: COMMERCIAL

## 2024-08-01 ENCOUNTER — OFFICE VISIT (OUTPATIENT)
Dept: URGENT CARE | Facility: MEDICAL CENTER | Age: 24
End: 2024-08-01
Payer: COMMERCIAL

## 2024-08-01 VITALS
DIASTOLIC BLOOD PRESSURE: 78 MMHG | SYSTOLIC BLOOD PRESSURE: 126 MMHG | TEMPERATURE: 97.5 F | RESPIRATION RATE: 20 BRPM | BODY MASS INDEX: 33.23 KG/M2 | OXYGEN SATURATION: 99 % | WEIGHT: 187.6 LBS | HEART RATE: 76 BPM

## 2024-08-01 DIAGNOSIS — S93.491A SPRAIN OF ANTERIOR TALOFIBULAR LIGAMENT OF RIGHT ANKLE, INITIAL ENCOUNTER: ICD-10-CM

## 2024-08-01 DIAGNOSIS — S93.491A SPRAIN OF ANTERIOR TALOFIBULAR LIGAMENT OF RIGHT ANKLE, INITIAL ENCOUNTER: Primary | ICD-10-CM

## 2024-08-01 PROCEDURE — 73630 X-RAY EXAM OF FOOT: CPT

## 2024-08-01 PROCEDURE — 99213 OFFICE O/P EST LOW 20 MIN: CPT | Performed by: PHYSICIAN ASSISTANT

## 2024-08-01 PROCEDURE — 73610 X-RAY EXAM OF ANKLE: CPT

## 2024-08-01 RX ORDER — DROSPIRENONE AND ETHINYL ESTRADIOL 0.02-3(28)
1 KIT ORAL DAILY
COMMUNITY

## 2024-08-01 NOTE — PATIENT INSTRUCTIONS
Apply ICE to the area 10-15min 3-4x daily  Motrin as needed for pain/swelling  Continue in ankle brace until pain free  Recommend follow-up with Orthopedics if symptoms persist or worsen.

## 2024-08-01 NOTE — PROGRESS NOTES
[facility]        NAME: Laurie Rojas is a 23 y.o. female  : 2000    MRN: [id number]  DATE: 2024  TIME: 11:04 AM    Assessment and Plan   Sprain of anterior talofibular ligament of right ankle, initial encounter [S93.491A]  1. Sprain of anterior talofibular ligament of right ankle, initial encounter  XR foot 3+ vw right    XR ankle 3+ vw right            Patient Instructions     Apply ICE to the area 10-15min 3-4x daily  Motrin as needed for pain/swelling  Continue in ankle brace until pain free  Recommend follow-up with Orthopedics if symptoms persist or worsen.     If tests have been performed at [facility], our office will contact you with results if changes need to be made to the care plan discussed with you at the visit.  You can review your full results on [facility] MyChart.    Chief Complaint     Chief Complaint   Patient presents with    Ankle Pain     Pt c/o right ankle pain Pt stated she twisted her right ankle on a dog bone. States the pain is radiating down the top foot and up the leg .          History of Present Illness       Laurie is a 23-year-old female who presents with a 5-day history of right-sided ankle pain.  Patient reports she was attempting to walk down some stairs when she slipped on a dog bone and twisted her right ankle.  She reports hearing and feeling a snap and pop, patient reports her pain is 8 out of 10 with weightbearing activity.  She also reports some minor ankle instability.  Patient does report a history of prior ankle sprains while playing sports.    Ankle Pain   Pertinent negatives include no numbness.       Review of Systems   Review of Systems   Constitutional: Negative.    Musculoskeletal:  Positive for gait problem and joint swelling.   Neurological:  Negative for weakness and numbness.         Current Medications       Current Outpatient Medications:     drospirenone-ethinyl estradiol (TORY) 3-0.02 MG per tablet, Take 1 tablet by mouth  daily, Disp: , Rfl:     EPINEPHrine (EPIPEN) 0.3 mg/0.3 mL SOAJ, As directed, Disp: , Rfl:     fexofenadine (ALLEGRA) 180 MG tablet, Take 1 tablet (180 mg total) by mouth daily, Disp: , Rfl:     benzonatate (TESSALON) 200 MG capsule, Take 1 capsule (200 mg total) by mouth 3 (three) times a day as needed for cough (Patient not taking: Reported on 8/1/2024), Disp: 30 capsule, Rfl: 0    fluticasone (VERAMYST) 27.5 MCG/SPRAY nasal spray, 2 sprays into each nostril daily (Patient not taking: Reported on 8/1/2024), Disp: , Rfl:     methylPREDNISolone 4 MG tablet therapy pack, Use as directed on package (Patient not taking: Reported on 8/1/2024), Disp: 1 each, Rfl: 0    Current Allergies     Allergies as of 08/01/2024 - Reviewed 08/01/2024   Allergen Reaction Noted    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     Past Medical History:   Diagnosis Date    Patient denies medical problems        History reviewed. No pertinent surgical history.    Family History   Problem Relation Age of Onset    Asthma Mother     Allergies Mother     Allergies Father     Diabetes Father          Medications have been verified.        Objective   /78   Pulse 76   Temp 97.5 °F (36.4 °C)   Resp 20   Wt 85.1 kg (187 lb 9.6 oz)   SpO2 99%   BMI 33.23 kg/m²   No LMP recorded.       Physical Exam     Physical Exam  Constitutional:       General: She is not in acute distress.     Appearance: Normal appearance. She is not ill-appearing.   Cardiovascular:      Rate and Rhythm: Normal rate and regular rhythm.      Heart sounds: Normal heart sounds. No murmur heard.  Pulmonary:      Effort: Pulmonary effort is normal.      Breath sounds: Normal breath sounds.   Musculoskeletal:      Right ankle:  Swelling present. Tenderness present over the lateral malleolus, ATF ligament and base of 5th metatarsal. Decreased range of motion. Anterior drawer test positive.   Neurological:      Mental Status: She is alert.       Initial review of x-ray of both the foot and ankle reveal no acute fractures or dislocations.  Final results as per radiology review.

## 2025-05-14 ENCOUNTER — APPOINTMENT (OUTPATIENT)
Dept: URGENT CARE | Facility: MEDICAL CENTER | Age: 25
End: 2025-05-14
Payer: OTHER MISCELLANEOUS

## 2025-05-14 DIAGNOSIS — S09.90XA INJURY OF HEAD, INITIAL ENCOUNTER: Primary | ICD-10-CM

## 2025-05-14 PROCEDURE — G0383 LEV 4 HOSP TYPE B ED VISIT: HCPCS

## 2025-05-14 PROCEDURE — 99284 EMERGENCY DEPT VISIT MOD MDM: CPT

## 2025-05-16 ENCOUNTER — APPOINTMENT (OUTPATIENT)
Dept: URGENT CARE | Facility: MEDICAL CENTER | Age: 25
End: 2025-05-16
Payer: OTHER MISCELLANEOUS

## 2025-05-16 PROCEDURE — 99213 OFFICE O/P EST LOW 20 MIN: CPT | Performed by: PHYSICIAN ASSISTANT

## 2025-05-23 ENCOUNTER — APPOINTMENT (OUTPATIENT)
Dept: URGENT CARE | Facility: MEDICAL CENTER | Age: 25
End: 2025-05-23
Payer: OTHER MISCELLANEOUS

## 2025-05-23 PROCEDURE — 99213 OFFICE O/P EST LOW 20 MIN: CPT

## 2025-07-23 ENCOUNTER — APPOINTMENT (OUTPATIENT)
Dept: LAB | Facility: MEDICAL CENTER | Age: 25
End: 2025-07-23

## 2025-07-23 ENCOUNTER — OCCMED (OUTPATIENT)
Dept: URGENT CARE | Facility: MEDICAL CENTER | Age: 25
End: 2025-07-23